# Patient Record
Sex: FEMALE | Race: WHITE | NOT HISPANIC OR LATINO | Employment: FULL TIME | URBAN - METROPOLITAN AREA
[De-identification: names, ages, dates, MRNs, and addresses within clinical notes are randomized per-mention and may not be internally consistent; named-entity substitution may affect disease eponyms.]

---

## 2021-05-27 ENCOUNTER — HOSPITAL ENCOUNTER (EMERGENCY)
Facility: HOSPITAL | Age: 51
Discharge: HOME | End: 2021-05-28
Attending: EMERGENCY MEDICINE | Admitting: EMERGENCY MEDICINE
Payer: COMMERCIAL

## 2021-05-27 ENCOUNTER — APPOINTMENT (EMERGENCY)
Dept: RADIOLOGY | Facility: HOSPITAL | Age: 51
End: 2021-05-27
Payer: COMMERCIAL

## 2021-05-27 VITALS
HEART RATE: 82 BPM | WEIGHT: 217 LBS | OXYGEN SATURATION: 97 % | TEMPERATURE: 97.7 F | DIASTOLIC BLOOD PRESSURE: 89 MMHG | SYSTOLIC BLOOD PRESSURE: 193 MMHG | RESPIRATION RATE: 16 BRPM

## 2021-05-27 DIAGNOSIS — S92.919A TOE FRACTURE: Primary | ICD-10-CM

## 2021-05-27 PROCEDURE — 99284 EMERGENCY DEPT VISIT MOD MDM: CPT | Performed by: EMERGENCY MEDICINE

## 2021-05-27 PROCEDURE — 99283 EMERGENCY DEPT VISIT LOW MDM: CPT

## 2021-05-27 PROCEDURE — 73660 X-RAY EXAM OF TOE(S): CPT

## 2021-05-27 RX ORDER — MELATONIN
1000 DAILY
COMMUNITY

## 2021-05-29 NOTE — ED PROVIDER NOTES
History  Chief Complaint   Patient presents with    Toe Injury     L 5TH is angled medially     HPI     Patient is a pleasant 45year old female who hit her left pinky toe on a pillow and in now hurts and is deformed  Achy nonradiating pain  No pain anywhere other than the 5th toe  Some bruising  No other injuries  No fall, no head strike, no wounds  Medical decision makin-year-old female, displaced toe fracture, reduced at bedside, patient handled the procedure well, will have her follow up with Podiatry  Static Splint Application and Check  Splint was applied on the same date as the visit  Consent: Verbal consent obtained  Risks and benefits: risks, benefits and alternatives were discussed  Consent given by: patient or family   Patient understanding: patient states understanding of the procedure being performed   Patient consent: the patient's understanding of the procedure matches consent given   Patient identity confirmed: verbally with patient and arm band   Location details: left pinky toe  Splint type: freya tape  Post-procedure: The splinted body part was neurovascularly unchanged following the procedure  Patient tolerance: Patient tolerated the procedure well with no immediate complications  Prior to Admission Medications   Prescriptions Last Dose Informant Patient Reported? Taking? cholecalciferol (VITAMIN D3) 1,000 units tablet   Yes Yes   Sig: Take 1,000 Units by mouth daily      Facility-Administered Medications: None       Past Medical History:   Diagnosis Date    COVID-19        Past Surgical History:   Procedure Laterality Date    KNEE SURGERY         History reviewed  No pertinent family history  I have reviewed and agree with the history as documented      E-Cigarette/Vaping    E-Cigarette Use Never User      E-Cigarette/Vaping Substances     Social History     Tobacco Use    Smoking status: Never Smoker    Smokeless tobacco: Never Used   Substance Use Topics    Alcohol use: Not Currently    Drug use: Never       Review of Systems   Musculoskeletal: Positive for arthralgias  All other systems reviewed and are negative  Physical Exam  Physical Exam  Vitals signs and nursing note reviewed  Constitutional:       Appearance: She is well-developed  HENT:      Head: Normocephalic and atraumatic  Right Ear: External ear normal       Left Ear: External ear normal    Eyes:      Conjunctiva/sclera: Conjunctivae normal    Neck:      Musculoskeletal: Normal range of motion and neck supple  Vascular: No JVD  Trachea: No tracheal deviation  Cardiovascular:      Rate and Rhythm: Normal rate and regular rhythm  Heart sounds: Normal heart sounds  Pulmonary:      Effort: Pulmonary effort is normal  No respiratory distress  Breath sounds: No wheezing or rales  Abdominal:      Palpations: Abdomen is soft  Tenderness: There is no abdominal tenderness  There is no guarding or rebound  Musculoskeletal:         General: Tenderness, deformity and signs of injury present  Skin:     General: Skin is warm and dry  Findings: No erythema or rash  Neurological:      General: No focal deficit present  Mental Status: She is alert and oriented to person, place, and time  Motor: No weakness  Psychiatric:         Behavior: Behavior normal          Thought Content:  Thought content normal          Vital Signs  ED Triage Vitals [05/27/21 2219]   Temperature Pulse Respirations Blood Pressure SpO2   97 7 °F (36 5 °C) 84 16 (!) 226/109 98 %      Temp src Heart Rate Source Patient Position - Orthostatic VS BP Location FiO2 (%)   -- -- -- -- --      Pain Score       3           Vitals:    05/27/21 2219 05/27/21 2345   BP: (!) 226/109 (!) 193/89   Pulse: 84 82         Visual Acuity      ED Medications  Medications - No data to display    Diagnostic Studies  Results Reviewed     None                 XR toe fifth min 2 views LEFT   Final Result by Zaina Soliman MD (05/28 2021)      Fracture of the proximal phalanx of the left 5th toe  Workstation performed: [id number]                    Procedures  Procedures         ED Course                             SBIRT 20yo+      Most Recent Value   SBIRT (23 yo +)   In order to provide better care to our patients, we are screening all of our patients for alcohol and drug use  Would it be okay to ask you these screening questions? Yes Filed at: 05/27/2021 2343   Initial Alcohol Screen: US AUDIT-C    1  How often do you have a drink containing alcohol?  0 Filed at: 05/27/2021 2343   2  How many drinks containing alcohol do you have on a typical day you are drinking? 0 Filed at: 05/27/2021 2343   3a  Male UNDER 65: How often do you have five or more drinks on one occasion? 0 Filed at: 05/27/2021 2343   3b  FEMALE Any Age, or MALE 65+: How often do you have 4 or more drinks on one occassion? 0 Filed at: 05/27/2021 2343   Audit-C Score  0 Filed at: 05/27/2021 2343   REGAN: How many times in the past year have you    Used an illegal drug or used a prescription medication for non-medical reasons? Never Filed at: 05/27/2021 2343                    MDM    Disposition  Final diagnoses: Toe fracture     Time reflects when diagnosis was documented in both MDM as applicable and the Disposition within this note     Time User Action Codes Description Comment    5/27/2021 10:57 PM Norman PORTER Add [[id number] 919A] Toe fracture       ED Disposition     ED Disposition Condition Date/Time Comment    Discharge Stable Thu May 27, 2021 10:57 PM Mayte Espino discharge to home/self care              Follow-up Information     Follow up With Specialties Details Why Contact Info    [facility]  In 1 day  Address: [address], [address]  Phone: [phone]          Discharge Medication List as of 5/27/2021 11:52 PM      CONTINUE these medications which have NOT CHANGED Details   cholecalciferol (VITAMIN D3) 1,000 units tablet Take 1,000 Units by mouth daily, Historical Med           No discharge procedures on file      PDMP Review     None          ED Provider  Electronically Signed by           Vassie Opitz, MD  05/30/21 1540

## 2024-03-28 ENCOUNTER — TELEPHONE (OUTPATIENT)
Dept: FAMILY MEDICINE CLINIC | Facility: CLINIC | Age: 54
End: 2024-03-28

## 2024-03-28 NOTE — TELEPHONE ENCOUNTER
Patient does not have her work schedule for April yet, when she gets it she will call the office back and set up her appt

## 2024-05-09 ENCOUNTER — TELEPHONE (OUTPATIENT)
Dept: CARDIOLOGY CLINIC | Facility: CLINIC | Age: 54
End: 2024-05-09

## 2024-05-09 NOTE — TELEPHONE ENCOUNTER
Did anybody speak to this pt about getting a zio monitor prior to discussion of an ablation? She stated someone gave her this number to set up a NV but I'm not seeing any documentation from our office.